# Patient Record
Sex: FEMALE | Race: WHITE | NOT HISPANIC OR LATINO | Employment: OTHER | URBAN - NONMETROPOLITAN AREA
[De-identification: names, ages, dates, MRNs, and addresses within clinical notes are randomized per-mention and may not be internally consistent; named-entity substitution may affect disease eponyms.]

---

## 2017-05-02 ENCOUNTER — OFFICE VISIT (OUTPATIENT)
Dept: OBSTETRICS AND GYNECOLOGY | Facility: CLINIC | Age: 62
End: 2017-05-02

## 2017-05-02 VITALS
BODY MASS INDEX: 20.89 KG/M2 | HEIGHT: 66 IN | SYSTOLIC BLOOD PRESSURE: 110 MMHG | WEIGHT: 130 LBS | DIASTOLIC BLOOD PRESSURE: 72 MMHG

## 2017-05-02 DIAGNOSIS — E28.39 ESTROGEN DEFICIENCY: ICD-10-CM

## 2017-05-02 DIAGNOSIS — Z01.419 WELL WOMAN EXAM WITH ROUTINE GYNECOLOGICAL EXAM: Primary | ICD-10-CM

## 2017-05-02 PROCEDURE — G0123 SCREEN CERV/VAG THIN LAYER: HCPCS | Performed by: NURSE PRACTITIONER

## 2017-05-02 PROCEDURE — 99396 PREV VISIT EST AGE 40-64: CPT | Performed by: NURSE PRACTITIONER

## 2017-05-02 RX ORDER — PANTOPRAZOLE SODIUM 40 MG/1
TABLET, DELAYED RELEASE ORAL
COMMUNITY
Start: 2017-04-04 | End: 2018-06-27

## 2017-05-02 RX ORDER — BUPROPION HYDROCHLORIDE 150 MG/1
TABLET ORAL
COMMUNITY
Start: 2017-04-04 | End: 2019-07-08 | Stop reason: ALTCHOICE

## 2017-05-03 LAB
GEN CATEG CVX/VAG CYTO-IMP: NORMAL
LAB AP CASE REPORT: NORMAL
LAB AP GYN ADDITIONAL INFORMATION: NORMAL
LAB AP GYN OTHER FINDINGS: NORMAL
Lab: NORMAL
PATH INTERP SPEC-IMP: NORMAL
STAT OF ADQ CVX/VAG CYTO-IMP: NORMAL

## 2017-05-16 ENCOUNTER — HOSPITAL ENCOUNTER (OUTPATIENT)
Dept: BONE DENSITY | Facility: HOSPITAL | Age: 62
Discharge: HOME | End: 2017-05-16

## 2017-05-16 ENCOUNTER — HOSPITAL ENCOUNTER (OUTPATIENT)
Dept: MAMMOGRAPHY | Facility: HOSPITAL | Age: 62
Discharge: HOME | End: 2017-05-16
Admitting: NURSE PRACTITIONER

## 2017-05-16 DIAGNOSIS — Z01.419 WELL WOMAN EXAM WITH ROUTINE GYNECOLOGICAL EXAM: ICD-10-CM

## 2017-05-16 DIAGNOSIS — E28.39 ESTROGEN DEFICIENCY: ICD-10-CM

## 2017-05-16 PROCEDURE — G0202 SCR MAMMO BI INCL CAD: HCPCS

## 2017-05-16 PROCEDURE — 77063 BREAST TOMOSYNTHESIS BI: CPT

## 2017-09-27 ENCOUNTER — TRANSCRIBE ORDERS (OUTPATIENT)
Dept: ADMINISTRATIVE | Facility: HOSPITAL | Age: 62
End: 2017-09-27

## 2017-09-27 ENCOUNTER — HOSPITAL ENCOUNTER (OUTPATIENT)
Dept: CT IMAGING | Facility: HOSPITAL | Age: 62
Discharge: HOME | End: 2017-09-27

## 2017-09-27 DIAGNOSIS — Z13.9 SCREENING: Primary | ICD-10-CM

## 2017-09-27 PROCEDURE — G0297 LDCT FOR LUNG CA SCREEN: HCPCS

## 2018-06-27 ENCOUNTER — OFFICE VISIT (OUTPATIENT)
Dept: OBSTETRICS AND GYNECOLOGY | Facility: CLINIC | Age: 63
End: 2018-06-27

## 2018-06-27 VITALS
SYSTOLIC BLOOD PRESSURE: 110 MMHG | HEIGHT: 67 IN | WEIGHT: 126 LBS | DIASTOLIC BLOOD PRESSURE: 80 MMHG | BODY MASS INDEX: 19.78 KG/M2

## 2018-06-27 DIAGNOSIS — Z01.419 WELL WOMAN EXAM WITH ROUTINE GYNECOLOGICAL EXAM: Primary | ICD-10-CM

## 2018-06-27 DIAGNOSIS — Z12.31 ENCOUNTER FOR SCREENING MAMMOGRAM FOR MALIGNANT NEOPLASM OF BREAST: ICD-10-CM

## 2018-06-27 PROCEDURE — 99396 PREV VISIT EST AGE 40-64: CPT | Performed by: NURSE PRACTITIONER

## 2018-06-27 PROCEDURE — G0123 SCREEN CERV/VAG THIN LAYER: HCPCS | Performed by: NURSE PRACTITIONER

## 2018-06-29 LAB
GEN CATEG CVX/VAG CYTO-IMP: NORMAL
LAB AP CASE REPORT: NORMAL
LAB AP GYN ADDITIONAL INFORMATION: NORMAL
LAB AP GYN OTHER FINDINGS: NORMAL
PATH INTERP SPEC-IMP: NORMAL
STAT OF ADQ CVX/VAG CYTO-IMP: NORMAL

## 2018-07-02 NOTE — PROGRESS NOTES
"Mary Jane Pandya is a 63 y.o.      Chief Complaint   Patient presents with   • Gynecologic Exam     Pt. voices no complaints           HPI -Mary Jane is in  For annual gyn exam.  She has no complaints and has no vaginal bleeding.    The following portions of the patient's history were reviewed and updated as appropriate:vital signs, allergies, current medications, past family history, past medical history, past social history, past surgical history and problem list.      Current Outpatient Prescriptions:   •  buPROPion XL (WELLBUTRIN XL) 150 MG 24 hr tablet, , Disp: , Rfl:     Review of Systems   Constitutional: Negative for activity change and chills.   HENT: Negative for congestion and facial swelling.    Eyes: Negative for discharge and itching.   Respiratory: Negative for apnea, cough and shortness of breath.    Cardiovascular: Negative for chest pain and leg swelling.   Gastrointestinal: Negative for abdominal distention and diarrhea.   Endocrine: Negative for heat intolerance and polydipsia.   Genitourinary: Negative for difficulty urinating, flank pain and hematuria.   Musculoskeletal: Negative for arthralgias and back pain.   Allergic/Immunologic: Negative for food allergies.   Neurological: Negative for dizziness, speech difficulty and light-headedness.   Psychiatric/Behavioral: Negative for agitation and confusion.     Breast ROS: negative    Objective      /80   Ht 170.2 cm (67\")   Wt 57.2 kg (126 lb)   BMI 19.73 kg/m²       Physical Exam   Constitutional: She is oriented to person, place, and time. She appears well-developed and well-nourished. No distress.   HENT:   Head: Normocephalic.   Right Ear: External ear normal.   Left Ear: External ear normal.   Nose: Nose normal.   Mouth/Throat: Oropharynx is clear and moist.   Eyes: Conjunctivae are normal. Right eye exhibits no discharge. Left eye exhibits no discharge. No scleral icterus.   Neck: Normal range of motion. Neck supple. " Carotid bruit is not present. No tracheal deviation present. No thyromegaly present.   Cardiovascular: Normal rate, regular rhythm, normal heart sounds and intact distal pulses.    No murmur heard.  Pulmonary/Chest: Effort normal and breath sounds normal. No respiratory distress. She has no wheezes. Right breast exhibits no inverted nipple, no mass, no nipple discharge, no skin change and no tenderness. Left breast exhibits no inverted nipple, no mass, no nipple discharge, no skin change and no tenderness. Breasts are symmetrical. There is no breast swelling.   Abdominal: Soft. She exhibits no distension and no mass. There is no tenderness. There is no guarding. No hernia. Hernia confirmed negative in the right inguinal area and confirmed negative in the left inguinal area.   Genitourinary: Rectum normal, vagina normal and uterus normal. Rectal exam shows no mass. No breast tenderness, discharge or bleeding. Pelvic exam was performed with patient supine. There is no rash, tenderness, lesion or injury on the right labia. There is no rash, tenderness, lesion or injury on the left labia. Uterus is not enlarged, not fixed and not tender. Cervix exhibits no motion tenderness, no discharge and no friability. Right adnexum displays no mass, no tenderness and no fullness. Left adnexum displays no mass, no tenderness and no fullness. No erythema, tenderness or bleeding in the vagina. No foreign body in the vagina. No signs of injury around the vagina. No vaginal discharge found.   Genitourinary Comments:   BSU normal  Urethral meatus  Normal  Perineum  Normal   Musculoskeletal: Normal range of motion. She exhibits no edema or tenderness.   Lymphadenopathy:        Head (right side): No submental, no submandibular, no tonsillar, no preauricular, no posterior auricular and no occipital adenopathy present.        Head (left side): No submental, no submandibular, no tonsillar, no preauricular, no posterior auricular and no  occipital adenopathy present.     She has no cervical adenopathy.        Right cervical: No superficial cervical, no deep cervical and no posterior cervical adenopathy present.       Left cervical: No superficial cervical, no deep cervical and no posterior cervical adenopathy present.     She has no axillary adenopathy.        Right: No inguinal adenopathy present.        Left: No inguinal adenopathy present.   Neurological: She is alert and oriented to person, place, and time. Coordination normal.   Skin: Skin is warm and dry. No bruising and no rash noted. She is not diaphoretic. No erythema.   Psychiatric: She has a normal mood and affect. Her behavior is normal. Judgment and thought content normal.   Nursing note and vitals reviewed.       Assessment/Plan     Mary Jane was seen today for gynecologic exam.    Diagnoses and all orders for this visit:    Well woman exam with routine gynecological exam  -     Liquid-based Pap Smear, Screening    Encounter for screening mammogram for malignant neoplasm of breast  -     Mammo Screening Digital Tomosynthesis Bilateral With CAD; Future    Patient is counseled re: BSE, diet, exercise, mammogram, calcium and Vit. D3              Catia Menchaca, APRN  7/2/2018

## 2018-07-11 ENCOUNTER — HOSPITAL ENCOUNTER (OUTPATIENT)
Dept: MAMMOGRAPHY | Facility: HOSPITAL | Age: 63
Discharge: HOME | End: 2018-07-11
Admitting: NURSE PRACTITIONER

## 2018-07-11 DIAGNOSIS — Z12.31 ENCOUNTER FOR SCREENING MAMMOGRAM FOR MALIGNANT NEOPLASM OF BREAST: ICD-10-CM

## 2018-07-11 PROCEDURE — 77067 SCR MAMMO BI INCL CAD: CPT

## 2018-07-11 PROCEDURE — 77063 BREAST TOMOSYNTHESIS BI: CPT

## 2018-07-19 ENCOUNTER — DOCUMENTATION (OUTPATIENT)
Dept: OBSTETRICS AND GYNECOLOGY | Facility: CLINIC | Age: 63
End: 2018-07-19

## 2018-09-06 ENCOUNTER — TRANSCRIBE ORDERS (OUTPATIENT)
Dept: ADMINISTRATIVE | Facility: HOSPITAL | Age: 63
End: 2018-09-06

## 2018-09-06 ENCOUNTER — HOSPITAL ENCOUNTER (OUTPATIENT)
Dept: CT IMAGING | Facility: HOSPITAL | Age: 63
Discharge: HOME | End: 2018-09-06

## 2018-09-06 DIAGNOSIS — Z12.9 CANCER SCREENING: Primary | ICD-10-CM

## 2018-09-06 PROCEDURE — G0297 LDCT FOR LUNG CA SCREEN: HCPCS

## 2019-07-08 ENCOUNTER — OFFICE VISIT (OUTPATIENT)
Dept: OBSTETRICS AND GYNECOLOGY | Facility: CLINIC | Age: 64
End: 2019-07-08

## 2019-07-08 VITALS
DIASTOLIC BLOOD PRESSURE: 80 MMHG | SYSTOLIC BLOOD PRESSURE: 120 MMHG | WEIGHT: 131 LBS | HEIGHT: 67 IN | BODY MASS INDEX: 20.56 KG/M2

## 2019-07-08 DIAGNOSIS — Z12.31 ENCOUNTER FOR SCREENING MAMMOGRAM FOR MALIGNANT NEOPLASM OF BREAST: ICD-10-CM

## 2019-07-08 DIAGNOSIS — N64.3 GALACTORRHEA NOT ASSOCIATED WITH CHILDBIRTH: ICD-10-CM

## 2019-07-08 DIAGNOSIS — Z01.419 WELL WOMAN EXAM WITH ROUTINE GYNECOLOGICAL EXAM: Primary | ICD-10-CM

## 2019-07-08 DIAGNOSIS — R10.2 PELVIC PAIN: ICD-10-CM

## 2019-07-08 DIAGNOSIS — Z78.0 MENOPAUSE: ICD-10-CM

## 2019-07-08 DIAGNOSIS — F32.89 OTHER DEPRESSION: ICD-10-CM

## 2019-07-08 PROCEDURE — 87624 HPV HI-RISK TYP POOLED RSLT: CPT | Performed by: NURSE PRACTITIONER

## 2019-07-08 PROCEDURE — G0123 SCREEN CERV/VAG THIN LAYER: HCPCS | Performed by: NURSE PRACTITIONER

## 2019-07-08 PROCEDURE — 99396 PREV VISIT EST AGE 40-64: CPT | Performed by: NURSE PRACTITIONER

## 2019-07-08 RX ORDER — BUPROPION HYDROCHLORIDE 300 MG/1
TABLET ORAL
Qty: 30 TABLET | Refills: 12 | Status: SHIPPED | OUTPATIENT
Start: 2019-07-08 | End: 2020-08-05

## 2019-07-08 RX ORDER — MULTIVIT WITH MINERALS/LUTEIN
1000 TABLET ORAL
COMMUNITY

## 2019-07-08 NOTE — PROGRESS NOTES
Mary Jane Pandya is a 64 y.o.      Chief Complaint   Patient presents with   • Gynecologic Exam     Pt voices no complaints.           HPI patient is in today for an annual exam.  She has no vaginal bleeding and no complaints other than intermittent pelvic pain that is very mild.  No history of abnormal Pap smears.  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      The following portions of the patient's history were reviewed and updated as appropriate:vital signs, allergies, current medications, past family history, past medical history, past social history, past surgical history and problem list.      Current Outpatient Medications:   •  buPROPion XL (WELLBUTRIN XL) 150 MG 24 hr tablet, , Disp: , Rfl:   •  CALCIUM PO, Take 500 mg by mouth., Disp: , Rfl:   •  vitamin E 1000 UNIT capsule, Take 1,000 Units by mouth., Disp: , Rfl:     Review of Systems   Constitutional: Negative for activity change, appetite change and fever.   HENT: Negative for congestion, dental problem, facial swelling, nosebleeds and sinus pressure.    Eyes: Negative for blurred vision, discharge and redness.   Respiratory: Negative for apnea, chest tightness and shortness of breath.    Gastrointestinal: Negative for abdominal distention, abdominal pain, constipation and nausea.        Off and on  Right pelvic  pain   Endocrine: Negative for cold intolerance and heat intolerance.   Genitourinary: Negative for breast discharge, difficulty urinating, flank pain, hematuria, urgency and vaginal bleeding.        Right pelvic pain off and on   Musculoskeletal: Negative for arthralgias, back pain, joint swelling and myalgias.   Neurological: Negative for syncope, speech difficulty, headache and confusion.   Psychiatric/Behavioral: Negative for agitation, decreased concentration, hallucinations and self-injury.         Objective  "     /80   Ht 170.2 cm (67\")   Wt 59.4 kg (131 lb)   BMI 20.52 kg/m²       Physical Exam   Constitutional: She is oriented to person, place, and time. She appears well-developed and well-nourished.   HENT:   Head: Normocephalic and atraumatic.   Eyes: Right eye exhibits no discharge. Left eye exhibits no discharge.   Neck: Neck supple. No thyromegaly present.   Cardiovascular: Normal rate and regular rhythm.   Pulmonary/Chest: Effort normal. No stridor. No respiratory distress.   Abdominal: Soft. She exhibits no distension. There is tenderness (off and mild right lower pelvic pain).   Genitourinary: Rectal exam shows guaiac negative stool. No vaginal discharge found.   Musculoskeletal: She exhibits no edema or deformity.   Neurological: She is alert and oriented to person, place, and time.   Skin: Skin is warm and dry.   Psychiatric: She has a normal mood and affect. Her behavior is normal. Thought content normal.   Nursing note and vitals reviewed.       Assessment/Plan     Mary Jane was seen today for gynecologic exam.    Diagnoses and all orders for this visit:    Well woman exam with routine gynecological exam    Encounter for screening mammogram for malignant neoplasm of breast  -     Mammo Screening Digital Tomosynthesis Bilateral With CAD; Future    Menopause    Galactorrhea not associated with childbirth  Mary Jane had a small bit of clear discharge from 1 nipple x 1.      Pelvic pain Ludy mentions that she has had mild pelvic pain of recent onset that she was concerned about.      .Patient is counseled re: BSE, diet, exercise, mammogram, calcium and Vit. D3    RTO for US for look at ovaries.      Catia Menchaca, APRN  7/8/2019           "

## 2019-07-09 LAB — PROLACTIN SERPL-MCNC: 7.9 NG/ML (ref 4.8–23.3)

## 2019-07-10 LAB
GEN CATEG CVX/VAG CYTO-IMP: NORMAL
HPV I/H RISK 4 DNA CVX QL PROBE+SIG AMP: NOT DETECTED
LAB AP CASE REPORT: NORMAL
LAB AP GYN ADDITIONAL INFORMATION: NORMAL
LAB AP GYN OTHER FINDINGS: NORMAL
PATH INTERP SPEC-IMP: NORMAL
STAT OF ADQ CVX/VAG CYTO-IMP: NORMAL

## 2019-07-22 ENCOUNTER — OFFICE VISIT (OUTPATIENT)
Dept: OBSTETRICS AND GYNECOLOGY | Facility: CLINIC | Age: 64
End: 2019-07-22

## 2019-07-22 ENCOUNTER — PROCEDURE VISIT (OUTPATIENT)
Dept: OBSTETRICS AND GYNECOLOGY | Facility: CLINIC | Age: 64
End: 2019-07-22

## 2019-07-22 ENCOUNTER — HOSPITAL ENCOUNTER (OUTPATIENT)
Dept: MAMMOGRAPHY | Facility: HOSPITAL | Age: 64
Discharge: HOME | End: 2019-07-22
Admitting: NURSE PRACTITIONER

## 2019-07-22 ENCOUNTER — APPOINTMENT (OUTPATIENT)
Dept: BONE DENSITY | Facility: HOSPITAL | Age: 64
End: 2019-07-22

## 2019-07-22 VITALS
DIASTOLIC BLOOD PRESSURE: 74 MMHG | HEIGHT: 67 IN | WEIGHT: 131 LBS | BODY MASS INDEX: 20.56 KG/M2 | SYSTOLIC BLOOD PRESSURE: 128 MMHG

## 2019-07-22 DIAGNOSIS — R10.2 PELVIC PAIN: ICD-10-CM

## 2019-07-22 DIAGNOSIS — N83.202 CYST OF LEFT OVARY: Primary | ICD-10-CM

## 2019-07-22 DIAGNOSIS — Z12.31 ENCOUNTER FOR SCREENING MAMMOGRAM FOR MALIGNANT NEOPLASM OF BREAST: ICD-10-CM

## 2019-07-22 DIAGNOSIS — R10.2 PELVIC PAIN: Primary | ICD-10-CM

## 2019-07-22 PROCEDURE — 77067 SCR MAMMO BI INCL CAD: CPT

## 2019-07-22 PROCEDURE — 99213 OFFICE O/P EST LOW 20 MIN: CPT | Performed by: NURSE PRACTITIONER

## 2019-07-22 PROCEDURE — 76830 TRANSVAGINAL US NON-OB: CPT | Performed by: OBSTETRICS & GYNECOLOGY

## 2019-07-22 PROCEDURE — 77063 BREAST TOMOSYNTHESIS BI: CPT

## 2019-07-22 NOTE — PROGRESS NOTES
"      Mary Jane Pandya is a 64 y.o.      Chief Complaint   Patient presents with   • Ovarian Cyst     Pt is here to discuss cyst.  She had an US today.           HPI -Mary Jane had been having mild right lower pelvic discomfort but it has now resolved.  She does not have any vaginal bleeding.      The following portions of the patient's history were reviewed and updated as appropriate:vital signs, allergies, current medications, past family history, past medical history, past social history, past surgical history and problem list.      Current Outpatient Medications:   •  buPROPion XL (WELLBUTRIN XL) 300 MG 24 hr tablet, 1 po q am, Disp: 30 tablet, Rfl: 12  •  CALCIUM PO, Take 500 mg by mouth., Disp: , Rfl:   •  vitamin E 1000 UNIT capsule, Take 1,000 Units by mouth., Disp: , Rfl:     Review of Systems   Constitutional: Negative for activity change, appetite change and fever.   HENT: Negative for congestion, dental problem, facial swelling, nosebleeds and sinus pressure.    Eyes: Negative for blurred vision, discharge and redness.   Respiratory: Negative for apnea, chest tightness and shortness of breath.    Gastrointestinal: Negative for abdominal distention, abdominal pain, constipation and nausea.        Off and on  Right pelvic  pain   Endocrine: Negative for cold intolerance and heat intolerance.   Genitourinary: Negative for breast discharge, difficulty urinating, flank pain, hematuria, urgency and vaginal bleeding.        Right pelvic pain off and on   Musculoskeletal: Negative for arthralgias, back pain, joint swelling and myalgias.   Neurological: Negative for syncope, speech difficulty, headache and confusion.   Psychiatric/Behavioral: Negative for agitation, decreased concentration, hallucinations and self-injury.         Objective      /74   Ht 170.2 cm (67\")   Wt 59.4 kg (131 lb)   BMI 20.52 kg/m²       Physical Exam   Constitutional: She is oriented to person, place, and time. She appears " well-developed and well-nourished. No distress.   HENT:   Head: Normocephalic and atraumatic.   Eyes: Right eye exhibits no discharge. Left eye exhibits no discharge.   Neck: Normal range of motion. No thyromegaly present.   Cardiovascular:   No murmur heard.  Pulmonary/Chest: Effort normal. She has no wheezes.   Abdominal: Soft. She exhibits no distension. There is no tenderness.   Musculoskeletal: Normal range of motion.   Neurological: She is alert and oriented to person, place, and time.   Skin: Skin is warm and dry.   Psychiatric: She has a normal mood and affect. Her behavior is normal. Judgment normal.   Nursing note and vitals reviewed.       Assessment/Plan     ASSESSMENT/PLAN    Mary Jane was seen today for ovarian cyst.    Diagnoses and all orders for this visit:    Cyst of left ovary  Comments:  1 cm  simple  US shows endometrium to be 5.2, fluid filled , patient is postmenopausal and has not vaginal bleeiding  Orders:  -         Pelvic pain  Right, mild and now has resolved.                Catia Menchaca, APRN  7/22/2019

## 2019-07-23 ENCOUNTER — APPOINTMENT (OUTPATIENT)
Dept: BONE DENSITY | Facility: HOSPITAL | Age: 64
End: 2019-07-23

## 2019-07-23 LAB — CANCER AG125 SERPL-ACNC: 13.4 U/ML (ref 0–38.1)

## 2019-07-29 ENCOUNTER — APPOINTMENT (OUTPATIENT)
Dept: BONE DENSITY | Facility: HOSPITAL | Age: 64
End: 2019-07-29

## 2019-08-12 ENCOUNTER — HOSPITAL ENCOUNTER (OUTPATIENT)
Dept: BONE DENSITY | Facility: HOSPITAL | Age: 64
Discharge: HOME | End: 2019-08-12
Admitting: NURSE PRACTITIONER

## 2019-08-12 DIAGNOSIS — Z78.0 MENOPAUSE: ICD-10-CM

## 2019-08-12 PROCEDURE — 77080 DXA BONE DENSITY AXIAL: CPT

## 2019-08-13 DIAGNOSIS — M85.80 OSTEOPENIA, UNSPECIFIED LOCATION: Primary | ICD-10-CM

## 2019-08-13 NOTE — PROGRESS NOTES
Tell Mary Jane that I want to check her vit. D and calcium as she has had a decrease in the bone density of her hip.    I will place the orders and she can come by and have theses done.  If they are normal, I will have her increase weight bearing exercises and adjust her Vit. D intake based on the levela.  Also, tell her that her  was normal.   DONN Garsia

## 2019-09-16 ENCOUNTER — TRANSCRIBE ORDERS (OUTPATIENT)
Dept: ADMINISTRATIVE | Facility: HOSPITAL | Age: 64
End: 2019-09-16

## 2019-09-16 ENCOUNTER — HOSPITAL ENCOUNTER (OUTPATIENT)
Dept: CT IMAGING | Facility: HOSPITAL | Age: 64
Discharge: HOME | End: 2019-09-16

## 2019-09-16 DIAGNOSIS — Z13.9 SCREENING PROCEDURE: ICD-10-CM

## 2019-09-16 DIAGNOSIS — Z13.9 SCREENING PROCEDURE: Primary | ICD-10-CM

## 2019-09-16 PROCEDURE — G0297 LDCT FOR LUNG CA SCREEN: HCPCS

## 2019-10-02 ENCOUNTER — TELEPHONE (OUTPATIENT)
Dept: OBSTETRICS AND GYNECOLOGY | Facility: CLINIC | Age: 64
End: 2019-10-02

## 2019-10-02 NOTE — TELEPHONE ENCOUNTER
LMOM that US  Shows a small simple cyst and Dr. Palacios said would not worry about it unless symptoms but could repeat it if she would like in 6 months to 1 year.  mitral regurgitation   LMOM to return my call.  DONN Garsia

## 2020-07-13 ENCOUNTER — OFFICE VISIT (OUTPATIENT)
Dept: OBSTETRICS AND GYNECOLOGY | Facility: CLINIC | Age: 65
End: 2020-07-13

## 2020-07-13 VITALS
HEIGHT: 67 IN | DIASTOLIC BLOOD PRESSURE: 70 MMHG | SYSTOLIC BLOOD PRESSURE: 110 MMHG | WEIGHT: 131 LBS | BODY MASS INDEX: 20.56 KG/M2

## 2020-07-13 DIAGNOSIS — N83.8 OVARIAN MASS, LEFT: ICD-10-CM

## 2020-07-13 DIAGNOSIS — Z78.0 POST-MENOPAUSE: ICD-10-CM

## 2020-07-13 DIAGNOSIS — Z01.419 WELL WOMAN EXAM WITH ROUTINE GYNECOLOGICAL EXAM: Primary | ICD-10-CM

## 2020-07-13 DIAGNOSIS — Z12.31 ENCOUNTER FOR SCREENING MAMMOGRAM FOR MALIGNANT NEOPLASM OF BREAST: ICD-10-CM

## 2020-07-13 DIAGNOSIS — M85.80 OSTEOPENIA AFTER MENOPAUSE: ICD-10-CM

## 2020-07-13 DIAGNOSIS — N83.202 CYST OF LEFT OVARY: ICD-10-CM

## 2020-07-13 DIAGNOSIS — Z78.0 OSTEOPENIA AFTER MENOPAUSE: ICD-10-CM

## 2020-07-13 PROBLEM — Z00.00 ENCOUNTER FOR GENERAL ADULT MEDICAL EXAMINATION WITHOUT ABNORMAL FINDINGS: Status: ACTIVE | Noted: 2020-07-13

## 2020-07-13 PROBLEM — N32.9 DISORDER OF BLADDER: Status: ACTIVE | Noted: 2020-07-13

## 2020-07-13 PROBLEM — J98.4 DISORDER OF LUNG: Status: ACTIVE | Noted: 2020-07-13

## 2020-07-13 PROBLEM — K58.9 IRRITABLE BOWEL SYNDROME: Status: ACTIVE | Noted: 2020-07-13

## 2020-07-13 PROCEDURE — 87624 HPV HI-RISK TYP POOLED RSLT: CPT | Performed by: NURSE PRACTITIONER

## 2020-07-13 PROCEDURE — G0123 SCREEN CERV/VAG THIN LAYER: HCPCS | Performed by: NURSE PRACTITIONER

## 2020-07-13 PROCEDURE — G0101 CA SCREEN;PELVIC/BREAST EXAM: HCPCS | Performed by: NURSE PRACTITIONER

## 2020-07-13 NOTE — PROGRESS NOTES
Attempted to obtain health maintenance information, patient unable to provide answers for the following items Tdap, Medicare Annual Wellness Exam, Zoster Vaccine and Hep C.

## 2020-07-13 NOTE — PROGRESS NOTES
"      Mary Jane Pandya is a 65 y.o.      Chief Complaint   Patient presents with   • Gynecologic Exam     I am here for a yearly exam.  No problems           HPI - Patient is in for gyn exam.  SHe is postmenopause and has no vaginal bleeding and she does not take HRT.  Last year she had left ovarian 1 cm small simple cyst with negative .      The following portions of the patient's history were reviewed and updated as appropriate:vital signs, allergies, current medications, past family history, past medical history, past social history, past surgical history and problem list.      Current Outpatient Medications:   •  buPROPion XL (WELLBUTRIN XL) 300 MG 24 hr tablet, 1 po q am, Disp: 30 tablet, Rfl: 12  •  CALCIUM PO, Take 500 mg by mouth., Disp: , Rfl:   •  vitamin E 1000 UNIT capsule, Take 1,000 Units by mouth., Disp: , Rfl:     Review of Systems  Breast ROS: neg  neg    Objective      /70 (BP Location: Right arm, Patient Position: Sitting, Cuff Size: Adult)   Ht 170.2 cm (67\")   Wt 59.4 kg (131 lb)   BMI 20.52 kg/m²       Physical Exam     Assessment/Plan     ASSESSMENT/PLAN    Mary Jane was seen today for gynecologic exam.    Diagnoses and all orders for this visit:    Well woman exam with routine gynecological exam  -     Liquid-based Pap Smear, Screening    Cyst of left ovary  -     US Pelvis Complete    Encounter for screening mammogram for malignant neoplasm of breast  -     Mammo Screening Digital Tomosynthesis Bilateral With CAD; Future    Osteopenia after menopause  -     Cancel: DEXA Bone Density Appendicular; Future    Post-menopause  -     Cancel: DEXA Bone Density Appendicular; Future        Patient is counseled re: BSE, diet, exercise, mammogram, calcium and Vit. D3        Catia Menchaca, APRN  2020          Patient is counseled re: BSE, diet, exercise, mammogram, calcium and Vit. D3  "

## 2020-08-03 ENCOUNTER — OFFICE VISIT (OUTPATIENT)
Dept: OBSTETRICS AND GYNECOLOGY | Facility: CLINIC | Age: 65
End: 2020-08-03

## 2020-08-03 ENCOUNTER — HOSPITAL ENCOUNTER (OUTPATIENT)
Dept: MAMMOGRAPHY | Facility: HOSPITAL | Age: 65
Discharge: HOME | End: 2020-08-03
Admitting: NURSE PRACTITIONER

## 2020-08-03 VITALS
DIASTOLIC BLOOD PRESSURE: 70 MMHG | SYSTOLIC BLOOD PRESSURE: 130 MMHG | HEIGHT: 67 IN | WEIGHT: 136 LBS | BODY MASS INDEX: 21.35 KG/M2

## 2020-08-03 DIAGNOSIS — Z87.891 FORMER SMOKER: ICD-10-CM

## 2020-08-03 DIAGNOSIS — Z87.42 HISTORY OF OVARIAN CYST: Primary | ICD-10-CM

## 2020-08-03 DIAGNOSIS — Z12.31 ENCOUNTER FOR SCREENING MAMMOGRAM FOR MALIGNANT NEOPLASM OF BREAST: ICD-10-CM

## 2020-08-03 PROCEDURE — 77067 SCR MAMMO BI INCL CAD: CPT

## 2020-08-03 PROCEDURE — 99213 OFFICE O/P EST LOW 20 MIN: CPT | Performed by: NURSE PRACTITIONER

## 2020-08-03 PROCEDURE — 77063 BREAST TOMOSYNTHESIS BI: CPT

## 2020-08-03 NOTE — PROGRESS NOTES
Attempted to obtain health maintenance information, patient unable to provide answers for the following items Tdap, Pneumococcal Vaccine, Zoster Vaccine and Hep C.

## 2020-08-03 NOTE — PROGRESS NOTES
"Subjective   Mary Jane Pandya is a 65 y.o. female.     Follow up  Ovarian cyst      The following portions of the patient's history were reviewed and updated as appropriate: allergies, current medications, past family history, past medical history, past social history, past surgical history and problem list.    /70 (BP Location: Right arm, Patient Position: Sitting, Cuff Size: Adult)   Ht 170.2 cm (67\")   Wt 61.7 kg (136 lb)   BMI 21.30 kg/m²     Review of Systems   Constitutional: Negative for activity change, appetite change, fatigue and fever.   Respiratory: Negative for apnea and shortness of breath.    Cardiovascular: Negative for chest pain and palpitations.   Gastrointestinal: Negative for abdominal distention, abdominal pain, constipation, diarrhea, nausea and vomiting.   Endocrine: Negative for cold intolerance and heat intolerance.   Genitourinary: Negative for difficulty urinating, frequency, menstrual problem, pelvic pain, vaginal bleeding, vaginal discharge and vaginal pain.   Neurological: Negative for headaches.   Psychiatric/Behavioral: Negative for agitation and sleep disturbance.       Objective   Physical Exam   Constitutional: She is oriented to person, place, and time. She appears well-developed.   Eyes: Right eye exhibits no discharge. Left eye exhibits no discharge.   Cardiovascular: Normal rate and regular rhythm.   Pulmonary/Chest: Effort normal and breath sounds normal.   Neurological: She is alert and oriented to person, place, and time.   Skin: Skin is warm.   Psychiatric: She has a normal mood and affect. Her behavior is normal. Judgment and thought content normal.   Vitals reviewed.      Assessment/Plan   Discussed US with pt.   US indicates uterus 4.69 cm, endometrial thickness 0.54 cm, normal appearing ovaries, small fibroid.     Advised pt given her the endometrial thickness I would like to continue annual US just to be sure that the lining remains stable.   Reviewed S&S to " report, pt voiced understanding.   Patient's Body mass index is 21.3 kg/m². BMI is within normal parameters. No follow-up required..    RV annual exam with US/prn.   Mary Jane was seen today for ovarian cyst.    Diagnoses and all orders for this visit:    History of ovarian cyst    BMI 21.0-21.9, adult    Former smoker

## 2020-08-04 DIAGNOSIS — F32.89 OTHER DEPRESSION: ICD-10-CM

## 2020-08-05 RX ORDER — BUPROPION HYDROCHLORIDE 300 MG/1
TABLET ORAL
Qty: 30 TABLET | Refills: 0 | Status: SHIPPED | OUTPATIENT
Start: 2020-08-05 | End: 2020-09-08

## 2020-09-03 DIAGNOSIS — F32.89 OTHER DEPRESSION: ICD-10-CM

## 2020-09-08 RX ORDER — BUPROPION HYDROCHLORIDE 300 MG/1
TABLET ORAL
Qty: 30 TABLET | Refills: 0 | Status: SHIPPED | OUTPATIENT
Start: 2020-09-08 | End: 2020-10-08

## 2020-10-06 DIAGNOSIS — F32.89 OTHER DEPRESSION: ICD-10-CM

## 2020-10-08 RX ORDER — BUPROPION HYDROCHLORIDE 300 MG/1
TABLET ORAL
Qty: 30 TABLET | Refills: 3 | Status: SHIPPED | OUTPATIENT
Start: 2020-10-08 | End: 2020-10-08 | Stop reason: SDUPTHER

## 2020-10-08 RX ORDER — BUPROPION HYDROCHLORIDE 300 MG/1
TABLET ORAL
Qty: 30 TABLET | Refills: 3 | Status: SHIPPED | OUTPATIENT
Start: 2020-10-08 | End: 2021-02-22 | Stop reason: SDUPTHER

## 2020-10-08 NOTE — TELEPHONE ENCOUNTER
Requests Wellbutrin RF with last yearly 7/13/2020 please see pended med and accept if appropriate.

## 2021-02-22 DIAGNOSIS — F32.89 OTHER DEPRESSION: ICD-10-CM

## 2021-02-22 RX ORDER — BUPROPION HYDROCHLORIDE 300 MG/1
TABLET ORAL
Qty: 30 TABLET | Refills: 3 | Status: SHIPPED | OUTPATIENT
Start: 2021-02-22

## 2021-02-22 RX ORDER — BUPROPION HYDROCHLORIDE 300 MG/1
TABLET ORAL
Qty: 30 TABLET | Refills: 3 | Status: CANCELLED | OUTPATIENT
Start: 2021-02-22

## 2021-04-26 ENCOUNTER — HOSPITAL ENCOUNTER (OUTPATIENT)
Dept: CT IMAGING | Facility: HOSPITAL | Age: 66
Discharge: HOME | End: 2021-04-26

## 2021-04-26 ENCOUNTER — TRANSCRIBE ORDERS (OUTPATIENT)
Dept: ADMINISTRATIVE | Facility: HOSPITAL | Age: 66
End: 2021-04-26

## 2021-04-26 DIAGNOSIS — Z13.9 SCREENING FOR UNSPECIFIED CONDITION: ICD-10-CM

## 2021-04-26 DIAGNOSIS — Z13.9 SCREENING FOR UNSPECIFIED CONDITION: Primary | ICD-10-CM

## 2021-04-26 PROCEDURE — 71271 CT THORAX LUNG CANCER SCR C-: CPT

## 2021-05-05 ENCOUNTER — OFFICE VISIT (OUTPATIENT)
Dept: OBSTETRICS AND GYNECOLOGY | Facility: CLINIC | Age: 66
End: 2021-05-05

## 2021-05-05 VITALS
BODY MASS INDEX: 20.56 KG/M2 | WEIGHT: 131 LBS | SYSTOLIC BLOOD PRESSURE: 118 MMHG | HEIGHT: 67 IN | DIASTOLIC BLOOD PRESSURE: 72 MMHG

## 2021-05-05 DIAGNOSIS — N81.10 FEMALE CYSTOCELE: ICD-10-CM

## 2021-05-05 DIAGNOSIS — N81.6 RECTOCELE: Primary | ICD-10-CM

## 2021-05-05 DIAGNOSIS — Z87.891 FORMER SMOKER: ICD-10-CM

## 2021-05-05 DIAGNOSIS — N94.9 VAGINAL DISCOMFORT: ICD-10-CM

## 2021-05-05 PROCEDURE — 99213 OFFICE O/P EST LOW 20 MIN: CPT | Performed by: OBSTETRICS & GYNECOLOGY

## 2021-05-05 PROCEDURE — 57160 INSERT PESSARY/OTHER DEVICE: CPT | Performed by: OBSTETRICS & GYNECOLOGY

## 2021-05-05 PROCEDURE — A4562 PESSARY, NON RUBBER,ANY TYPE: HCPCS | Performed by: OBSTETRICS & GYNECOLOGY

## 2021-05-05 RX ORDER — CETIRIZINE HYDROCHLORIDE 10 MG/1
10 TABLET ORAL DAILY
COMMUNITY

## 2021-05-05 NOTE — PROGRESS NOTES
"Subjective     Chief Complaint   Patient presents with   • Prolapse     pt c/o prolapse, she is unsure if it is uterus or bladder. c/o some pressure with it.        Mary Jane Pandya is a 66 y.o. year old who presents to be seen for pelvic prolapse.  Patient reports that she recently had a CT done in [place] for sharp LLQ pain, but says that it was normal.  Patient frustrated because \"I am normally a very active person\".  \"This starts off not too bad in the morning, but then as soon as I lift something, there is something like a golf ball coming out of me\".    The patient is not s/p hysterectomy.  She reports positive vaginal pressure and bulge outside her body, but denies urinary incontinence, urinary hesitancy and stool trapping.  The patient feels like the problem began 6 weeks ago.  She is currently sexually active, and is interested in being sexually active in the future.  Mary Jane Pandya has not had surgery for this problem in the past.    Past Medical History:   Diagnosis Date   • Anxiety    • RENE-1 4G/4G genotype        Current Outpatient Medications:   •  BIOTIN PO, Take  by mouth., Disp: , Rfl:   •  buPROPion XL (WELLBUTRIN XL) 300 MG 24 hr tablet, Take daily, Disp: 30 tablet, Rfl: 3  •  CALCIUM PO, Take 500 mg by mouth., Disp: , Rfl:   •  cetirizine (zyrTEC) 10 MG tablet, Take 10 mg by mouth Daily., Disp: , Rfl:   •  Probiotic Product (PROBIOTIC-10 PO), Take  by mouth., Disp: , Rfl:   •  VITAMIN D PO, Take  by mouth., Disp: , Rfl:   •  vitamin E 1000 UNIT capsule, Take 1,000 Units by mouth., Disp: , Rfl:   Family History   Problem Relation Age of Onset   • No Known Problems Mother    • Heart attack Father    • No Known Problems Brother    • Breast cancer Neg Hx    • Ovarian cancer Neg Hx    • Uterine cancer Neg Hx    • Colon cancer Neg Hx      Social History     Socioeconomic History   • Marital status:      Spouse name: Not on file   • Number of children: Not on file   • Years of education: Not on " "file   • Highest education level: Not on file   Tobacco Use   • Smoking status: Former Smoker     Quit date: 1970     Years since quittin.8   • Smokeless tobacco: Never Used   • Tobacco comment: pt quit 36 years ago, smoked for 3 years    Substance and Sexual Activity   • Alcohol use: Yes     Comment: social   • Drug use: No   • Sexual activity: Yes     Partners: Male     Birth control/protection: Post-menopausal     No Known Allergies    Family History   Problem Relation Age of Onset   • No Known Problems Mother    • Heart attack Father    • No Known Problems Brother    • Breast cancer Neg Hx    • Ovarian cancer Neg Hx    • Uterine cancer Neg Hx    • Colon cancer Neg Hx      Review of Systems   Constitutional: Negative for activity change and unexpected weight change.   Respiratory: Negative for shortness of breath.    Cardiovascular: Negative for chest pain.   Gastrointestinal: Negative for abdominal pain, constipation and diarrhea.   Genitourinary: Positive for pelvic pain and vaginal pain (pressure). Negative for difficulty urinating, enuresis (mild MARINA on only rare occasions), frequency, urgency, vaginal bleeding and vaginal discharge.           Objective   /72 (BP Location: Left arm, Patient Position: Sitting)   Ht 170.2 cm (67\")   Wt 59.4 kg (131 lb)   BMI 20.52 kg/m²     Physical Exam  Vitals and nursing note reviewed.   Constitutional:       General: She is not in acute distress.     Appearance: She is well-developed.   HENT:      Head: Normocephalic and atraumatic.   Neck:      Thyroid: No thyromegaly.   Pulmonary:      Effort: Pulmonary effort is normal.   Abdominal:      General: There is no distension.      Palpations: Abdomen is soft.      Tenderness: There is no abdominal tenderness.   Genitourinary:     General: Normal vulva.      Comments: Good support of uterus/cervix.  Grade III cystocele, grade I rectocele.  Vaginal mucosa pale, with loss of rugae.  Musculoskeletal:         " General: Normal range of motion.      Cervical back: Normal range of motion.   Skin:     General: Skin is warm and dry.   Neurological:      Mental Status: She is alert and oriented to person, place, and time.   Psychiatric:         Behavior: Behavior normal.         Judgment: Judgment normal.         Imaging   No data reviewed       Assessment & Plan    Diagnoses and all orders for this visit:    1. Rectocele (Primary)  Comments:  grade I    2. Female cystocele: Patient denies bladder or bowel symptoms, but is bothered by the vaginal pressure and the pulling sensation in her lower pelvis.  She is noted to position to schedule surgery at this time, since she is caring for her grandchildren Monday through Friday.  Patient would not need a hysterectomy since she still has great apical support of the uterus and cervix.  Conservative measures have been discussed to include pelvic floor physical therapy, Nazanin Kendra touch lasering, and prn pessary use.  Patient planning to do physical therapy in [place], since that is where she goes every week to care for her grandchildren.  Patient also want to be fitted with a pessary today, and found a #1 Gellhorn pessary to be comfortable.  I wanted the patient to come back to the office in 1 to 2 weeks for follow-up of the pessary, but she did not feel this was necessary; she felt comfortable taking it in and out to use as needed.  Patient is planning to return to the office when her annual exam is due in July, but was encouraged to call or return sooner if she was having any problems with the pessary.  Comments:  grade III    3. Vaginal discomfort: pressure    4. Former smoker        Anel Kam MD  5/5/2021  10:16 CDT

## 2021-07-23 ENCOUNTER — OFFICE VISIT (OUTPATIENT)
Dept: OBSTETRICS AND GYNECOLOGY | Facility: CLINIC | Age: 66
End: 2021-07-23

## 2021-07-23 VITALS
BODY MASS INDEX: 20.4 KG/M2 | WEIGHT: 130 LBS | SYSTOLIC BLOOD PRESSURE: 112 MMHG | DIASTOLIC BLOOD PRESSURE: 74 MMHG | HEIGHT: 67 IN

## 2021-07-23 DIAGNOSIS — N81.6 RECTOCELE: ICD-10-CM

## 2021-07-23 DIAGNOSIS — Z87.891 FORMER SMOKER: ICD-10-CM

## 2021-07-23 DIAGNOSIS — N81.10 FEMALE CYSTOCELE: ICD-10-CM

## 2021-07-23 DIAGNOSIS — Z12.31 ENCOUNTER FOR SCREENING MAMMOGRAM FOR MALIGNANT NEOPLASM OF BREAST: ICD-10-CM

## 2021-07-23 DIAGNOSIS — Z01.419 WELL WOMAN EXAM WITH ROUTINE GYNECOLOGICAL EXAM: ICD-10-CM

## 2021-07-23 DIAGNOSIS — Z78.0 MENOPAUSE: Primary | ICD-10-CM

## 2021-07-23 PROCEDURE — G0101 CA SCREEN;PELVIC/BREAST EXAM: HCPCS | Performed by: OBSTETRICS & GYNECOLOGY

## 2021-07-23 RX ORDER — MULTIPLE VITAMINS W/ MINERALS TAB 9MG-400MCG
1 TAB ORAL DAILY
COMMUNITY

## 2021-07-23 NOTE — PROGRESS NOTES
Subjective   Chief Complaint   Patient presents with   • Annual Exam     pt here today for annual exam. pt says that she is no longer using a pessary.  pt voices no other concerns.      Mary Jane Pandya is a 66 y.o. year old  menopausal female presenting to be seen for her annual exam.  Overall, the patient reports to be feeling well.  Patient was fitted for a pessary in May, but did not do well with it.  She had a lot of pressure and feels like it was sticking out ([place]).  Additionally, she had tremendous discharge.  Patient has been doing physical therapy exercises and feels like the problem has improved. She also admits that they were moving and doing a lot of heavy lifting - not that the lifting is done, she feels like that has helped too.  The patient denies any vaginal bleeding in the past 12 months  Hot flashes and night sweats are not a significant problem.    She is sexually active.  In the past year there has not been new sexual partners.  She is not having any problems.    Patient reports regular self breast exams: no  She exercises regularly: yes.  She has noticed changes in height: no.    No Additional Complaints Reported    The following portions of the patient's history were reviewed and updated as appropriate:problem list, current medications, allergies, past family history, past medical history, past social history and past surgical history.  Social History    Tobacco Use      Smoking status: Former Smoker        Quit date: 1970        Years since quittin.0      Smokeless tobacco: Never Used      Tobacco comment: pt quit 36 years ago, smoked for 3 years     Review of Systems   Constitutional: Negative for activity change and unexpected weight change.   HENT: Negative for congestion.    Eyes: Negative for visual disturbance.   Respiratory: Negative for shortness of breath.    Cardiovascular: Negative for chest pain.   Gastrointestinal: Negative for abdominal pain, blood in stool,  "constipation (occasionally, manages with diet) and diarrhea.        Colonoscopy up-to-date (does them in [place])   Endocrine: Negative for cold intolerance and heat intolerance.   Genitourinary: Negative for difficulty urinating, dyspareunia, enuresis, frequency, pelvic pain, urgency, vaginal discharge and vaginal pain.   Musculoskeletal: Positive for arthralgias. Negative for back pain, neck pain and neck stiffness.   Skin: Negative for rash.   Neurological: Negative for dizziness and headaches.   Psychiatric/Behavioral: Negative for dysphoric mood and sleep disturbance. The patient is nervous/anxious (well-controlled with Wellbutrin).          Objective   /74   Ht 170.2 cm (67\")   Wt 59 kg (130 lb)   BMI 20.36 kg/m²   Physical Exam  Vitals and nursing note reviewed. Exam conducted with a chaperone present.   Constitutional:       General: She is not in acute distress.     Appearance: She is well-developed.   HENT:      Head: Normocephalic and atraumatic.   Neck:      Thyroid: No thyromegaly.   Cardiovascular:      Rate and Rhythm: Normal rate and regular rhythm.      Heart sounds: No murmur heard.     Pulmonary:      Effort: Pulmonary effort is normal.      Breath sounds: Normal breath sounds.   Chest:      Breasts:         Right: No inverted nipple or mass.         Left: No inverted nipple or mass.   Abdominal:      General: There is no distension.      Palpations: Abdomen is soft.      Tenderness: There is no abdominal tenderness.   Genitourinary:     General: Normal vulva.      Exam position: Lithotomy position.      Labia:         Right: No tenderness or lesion.         Left: No tenderness or lesion.       Urethra: No prolapse.      Vagina: Normal. No vaginal discharge or bleeding.      Cervix: No cervical motion tenderness or discharge.      Adnexa:         Right: No tenderness or fullness.          Left: No tenderness or fullness.        Rectum: No external hemorrhoid or internal hemorrhoid. " Normal anal tone.      Comments: Labia normal, no lesions noted.  Urethral meatus unremarkable, no prolapse of mucosa.  Anus/perineum normal.  Grade II cystocele, grade I rectocele  Musculoskeletal:         General: Normal range of motion.      Cervical back: Normal range of motion and neck supple.   Skin:     General: Skin is warm and dry.   Neurological:      Mental Status: She is alert and oriented to person, place, and time.   Psychiatric:         Behavior: Behavior normal.         Judgment: Judgment normal.            Assessment & Plan    Diagnoses and all orders for this visit:    1. Menopause (Primary)  -     Mammo Screening Bilateral With CAD; Future  -     DEXA Bone Density Axial; Future    2. Encounter for screening mammogram for malignant neoplasm of breast   -     Mammo Screening Bilateral With CAD; Future    3. Female cystocele  Comments:  grade II    4. Former smoker    5. BMI 20.0-20.9, adult: Normal. Patient exercises regularly    6. Rectocele  Comments:  grade I    7.  Well woman exam: Exam remarkable for mild pelvic organ prolapse. Patient previously tried a pessary, but was feeling a lot of pressure from it, so she remove the pessary and has decided to continue with expectant management till symptoms are worse. Regular self breast exam was encouraged, and technique reviewed during exam. Mammogram and bone density both ordered. Routine screening labs managed by PCP. Patient does colonoscopies in [place] because her daughter lives there and is a nurse practitioner. Patient reports colonoscopy is up-to-date.        This note was electronically signed.    Anel Kam MD  7/24/2021  11:25 CDT

## 2021-09-10 ENCOUNTER — HOSPITAL ENCOUNTER (OUTPATIENT)
Dept: BONE DENSITY | Facility: HOSPITAL | Age: 66
Discharge: HOME | End: 2021-09-10

## 2021-09-10 ENCOUNTER — HOSPITAL ENCOUNTER (OUTPATIENT)
Dept: MAMMOGRAPHY | Facility: HOSPITAL | Age: 66
Discharge: HOME | End: 2021-09-10

## 2021-09-10 DIAGNOSIS — Z78.0 MENOPAUSE: ICD-10-CM

## 2021-09-10 DIAGNOSIS — Z12.31 ENCOUNTER FOR SCREENING MAMMOGRAM FOR MALIGNANT NEOPLASM OF BREAST: ICD-10-CM

## 2021-09-10 PROCEDURE — 77063 BREAST TOMOSYNTHESIS BI: CPT

## 2021-09-10 PROCEDURE — 77067 SCR MAMMO BI INCL CAD: CPT

## 2021-09-10 PROCEDURE — 77080 DXA BONE DENSITY AXIAL: CPT

## 2021-09-11 NOTE — PROGRESS NOTES
Please advise patient that her T score has decreased further, although the changes slight.  If she is not already doing so, I recommend faithful calcium with vitamin D, as well as weightbearing exercise several times a week.  If the patient is ready following these recommendations, then she may want to begin medication to slow any further bone loss and we should discuss that at an office visit.

## 2021-10-08 ENCOUNTER — OFFICE VISIT (OUTPATIENT)
Dept: OBSTETRICS AND GYNECOLOGY | Facility: CLINIC | Age: 66
End: 2021-10-08

## 2021-10-08 VITALS
SYSTOLIC BLOOD PRESSURE: 122 MMHG | BODY MASS INDEX: 20.72 KG/M2 | WEIGHT: 132 LBS | DIASTOLIC BLOOD PRESSURE: 72 MMHG | HEIGHT: 67 IN

## 2021-10-08 DIAGNOSIS — N81.10 VAGINAL PROLAPSE: ICD-10-CM

## 2021-10-08 DIAGNOSIS — N94.10 FEMALE DYSPAREUNIA: ICD-10-CM

## 2021-10-08 DIAGNOSIS — N39.3 SUI (STRESS URINARY INCONTINENCE, FEMALE): ICD-10-CM

## 2021-10-08 PROCEDURE — MONALISA: Performed by: OBSTETRICS & GYNECOLOGY

## 2021-10-08 NOTE — PROGRESS NOTES
"Mary Jane Pandya is a 66 y.o. year old  presenting for Nazanin Kendra Touch treatment 1.  The patient's indication for the procedure is vaginal prolapse, mild MARINA and painful intercourse.    N[facility]in [facility] Touch consent signed by the patient:  Yes    Patient placed in lithotomy position:  Yes    Topical lidocaine cream applied externally:  Yes    Protective eyewear given to patient:  Yes    Vagina swabbed to remove any discharge or creams:  Yes    Topical anesthetic cream removed:  Yes    /72   Ht 170.2 cm (67\")   Wt 59.9 kg (132 lb)   BMI 20.67 kg/m²     Internal Treatment  Power 30 W  Dwell time 1000 micro sec Spacing 1000 micro m  Shape Square  Smart Stack  1   Density 6.4%  Size 100%  Scan Mode Normal   Fluency 2.20 J/cm 2  Ratio 10/10  Exposure Single   Pulse Energy 43.2 mJ  Aiming 30%  Emission  DP      External Treatment  Power 26 W  Dwell time 800 micro sec  Spacing 800 micro m  Shape Square  Smart Stack  1   Density 8.7 %  Size 100%  Scan Mode Normal   Fluency 1.93 J/cm 2  Ratio 10/10  Exposure Single   Pulse Energy 27.7 mJ  Aiming 30%  Emission  Smart Pulse      Cold pack applied externally for 3-5 minutes immediately following treatment:  Yes    Patient tolerated procedure: well    Silicone applied to external skin after cooling:  Yes    Patient given Post-procedure instructions:  Yes    Patient will schedule to return for Nazanin Kendra Touch treatment 2 in 6 weeks.    Anel Kam MD  2021    "

## 2021-11-19 ENCOUNTER — OFFICE VISIT (OUTPATIENT)
Dept: OBSTETRICS AND GYNECOLOGY | Facility: CLINIC | Age: 66
End: 2021-11-19

## 2021-11-19 VITALS
HEIGHT: 67 IN | DIASTOLIC BLOOD PRESSURE: 70 MMHG | SYSTOLIC BLOOD PRESSURE: 112 MMHG | WEIGHT: 133 LBS | BODY MASS INDEX: 20.88 KG/M2

## 2021-11-19 DIAGNOSIS — N94.10 FEMALE DYSPAREUNIA: Primary | ICD-10-CM

## 2021-11-19 DIAGNOSIS — N39.3 SUI (STRESS URINARY INCONTINENCE, FEMALE): ICD-10-CM

## 2021-11-19 DIAGNOSIS — N81.10 VAGINAL PROLAPSE: ICD-10-CM

## 2021-11-19 PROCEDURE — MONALISA: Performed by: OBSTETRICS & GYNECOLOGY

## 2021-11-19 NOTE — PROGRESS NOTES
"Mary Jane Pandya is a 66 y.o. year old  presenting for Nazanin Kendra Touch treatment 2.  The patient's indication for the procedure is dyspareunia, vaginal atrophy and MARINA and mild vaginal prolapse.  Since her first treatment 6 weeks ago, the patient has noticed moderate improvement in MARINA, but no difference in prolapse pressure.    Nazanin Kendra Touch consent signed by the patient:  Yes    Patient placed in lithotomy position:  Yes    Topical lidocaine cream applied externally:  Yes    Protective eyewear given to patient:  Yes    Vagina swabbed to remove any discharge or creams:  Yes    Topical anesthetic cream removed:  Yes    /70   Ht 170.2 cm (67\")   Wt 60.3 kg (133 lb)   BMI 20.83 kg/m²     Internal Treatment  Power 30 W  Dwell time 1000 micro sec Spacing 1000 micro m  Shape Square  Smart Stack  3   Density 6.4%  Size 100%  Scan Mode Normal   Fluency 6.61 J/cm 2  Ratio 10/10  Exposure Single   Pulse Energy 129.6 mJ  Aiming 30%  Emission  DP      External Treatment  Power 26 W  Dwell time 800 micro sec  Spacing 800 micro m  Shape Square  Smart Stack  1   Density 8.7%  Size 100%  Scan Mode Normal   Fluency 1.93 J/cm 2  Ratio 10/10  Exposure Single   Pulse Energy 27.7 mJ  Aiming 30%  Emission  Smart Pulse      Cold pack applied externally for 3-5 minutes immediately following treatment:  Yes    Patient tolerated procedure: well    Silicone applied to external skin after cooling:  Yes    Patient given Post-procedure instructions:  Yes    Patient will schedule to return for Nazanin Kendra Touch treatment 3 in 6 weeks.    Anel Kam MD  2021    "

## 2022-01-21 ENCOUNTER — OFFICE VISIT (OUTPATIENT)
Dept: OBSTETRICS AND GYNECOLOGY | Facility: CLINIC | Age: 67
End: 2022-01-21

## 2022-01-21 VITALS
SYSTOLIC BLOOD PRESSURE: 108 MMHG | HEIGHT: 67 IN | BODY MASS INDEX: 21.35 KG/M2 | DIASTOLIC BLOOD PRESSURE: 64 MMHG | WEIGHT: 136 LBS

## 2022-01-21 DIAGNOSIS — N81.10 VAGINAL PROLAPSE: Primary | ICD-10-CM

## 2022-01-21 DIAGNOSIS — N39.3 SUI (STRESS URINARY INCONTINENCE, FEMALE): ICD-10-CM

## 2022-01-21 DIAGNOSIS — N81.10 FEMALE CYSTOCELE: ICD-10-CM

## 2022-01-21 DIAGNOSIS — N94.10 FEMALE DYSPAREUNIA: ICD-10-CM

## 2022-01-21 PROCEDURE — MONALISA: Performed by: OBSTETRICS & GYNECOLOGY

## 2022-01-21 NOTE — PROGRESS NOTES
"Mary Jane Pandya is a 66 y.o. year old  presenting for Nazanin Kendra Touch treatment 3.  The patient's indication for the procedure is dyspareunia, vaginal atrophy and MARINA and mild vaginal prolapse.  Since her first treatment 14 weeks ago, the patient has noticed moderate improvement in MARINA, and mild improvement in prolapse pressure.    Nazanin Kendra Touch consent signed by the patient:  Yes    Patient placed in lithotomy position:  Yes    Topical lidocaine cream applied externally:  Yes    Protective eyewear given to patient:  Yes    Vagina swabbed to remove any discharge or creams:  Yes    Topical anesthetic cream removed:  Yes    /64 (BP Location: Right arm, Patient Position: Sitting, Cuff Size: Adult)   Ht 170.2 cm (67\")   Wt 61.7 kg (136 lb)   Breastfeeding No   BMI 21.30 kg/m²     Internal Treatment  Power 30 W  Dwell time 1000 micro sec Spacing 1000 micro m  Shape Square  Smart Stack  3   Density 6.4%  Size 100%  Scan Mode Normal   Fluency 6.61 J/cm 2  Ratio 10/10  Exposure Single   Pulse Energy 129.6 mJ  Aiming 30%  Emission  DP      External Treatment  Power 26 W  Dwell time 800 micro sec  Spacing 800 micro m  Shape Square  Smart Stack  1   Density 8.7%  Size 100%  Scan Mode Normal   Fluency 1.93 J/cm 2  Ratio 10/10  Exposure Single   Pulse Energy 27.7 mJ  Aiming 30%  Emission  Smart Pulse      Cold pack applied externally for 3-5 minutes immediately following treatment:  Yes    Patient tolerated procedure: well    Silicone applied to external skin after cooling:  Yes    Patient given Post-procedure instructions:  Yes    Patient will schedule to return for follow-up in 6 weeks.    Anel Kam MD  2022  "

## 2022-04-08 ENCOUNTER — HOSPITAL ENCOUNTER (OUTPATIENT)
Dept: CT IMAGING | Facility: HOSPITAL | Age: 67
Discharge: HOME | End: 2022-04-08

## 2022-04-08 ENCOUNTER — TRANSCRIBE ORDERS (OUTPATIENT)
Dept: ADMINISTRATIVE | Facility: HOSPITAL | Age: 67
End: 2022-04-08

## 2022-04-08 DIAGNOSIS — Z13.9 SCREENING FOR UNSPECIFIED CONDITION: ICD-10-CM

## 2022-04-08 DIAGNOSIS — Z13.9 SCREENING FOR UNSPECIFIED CONDITION: Primary | ICD-10-CM

## 2022-04-08 PROCEDURE — 71271 CT THORAX LUNG CANCER SCR C-: CPT

## 2022-05-05 ENCOUNTER — TRANSCRIBE ORDERS (OUTPATIENT)
Dept: ADMINISTRATIVE | Facility: HOSPITAL | Age: 67
End: 2022-05-05

## 2022-05-05 ENCOUNTER — HOSPITAL ENCOUNTER (OUTPATIENT)
Dept: GENERAL RADIOLOGY | Facility: HOSPITAL | Age: 67
Discharge: HOME | End: 2022-05-05

## 2022-05-05 DIAGNOSIS — Z12.9 CANCER SCREENING: Primary | ICD-10-CM

## 2022-05-05 DIAGNOSIS — Z12.9 CANCER SCREENING: ICD-10-CM

## 2022-05-05 PROCEDURE — 71046 X-RAY EXAM CHEST 2 VIEWS: CPT

## 2022-08-15 ENCOUNTER — TRANSCRIBE ORDERS (OUTPATIENT)
Dept: ADMINISTRATIVE | Facility: HOSPITAL | Age: 67
End: 2022-08-15

## 2022-08-15 DIAGNOSIS — Z12.31 ENCOUNTER FOR SCREENING MAMMOGRAM FOR MALIGNANT NEOPLASM OF BREAST: Primary | ICD-10-CM

## 2022-09-23 ENCOUNTER — HOSPITAL ENCOUNTER (OUTPATIENT)
Dept: MAMMOGRAPHY | Facility: HOSPITAL | Age: 67
Discharge: HOME | End: 2022-09-23
Admitting: OBSTETRICS & GYNECOLOGY

## 2022-09-23 DIAGNOSIS — Z12.31 ENCOUNTER FOR SCREENING MAMMOGRAM FOR MALIGNANT NEOPLASM OF BREAST: ICD-10-CM

## 2022-09-23 PROCEDURE — 77063 BREAST TOMOSYNTHESIS BI: CPT

## 2022-09-23 PROCEDURE — 77067 SCR MAMMO BI INCL CAD: CPT

## 2023-04-14 ENCOUNTER — HOSPITAL ENCOUNTER (OUTPATIENT)
Dept: CT IMAGING | Facility: HOSPITAL | Age: 68
Discharge: HOME | End: 2023-04-14

## 2023-04-14 ENCOUNTER — TRANSCRIBE ORDERS (OUTPATIENT)
Dept: ADMINISTRATIVE | Facility: HOSPITAL | Age: 68
End: 2023-04-14
Payer: MEDICARE

## 2023-04-14 DIAGNOSIS — Z13.9 SCREENING FOR UNSPECIFIED CONDITION: ICD-10-CM

## 2023-04-14 DIAGNOSIS — Z13.9 SCREENING FOR UNSPECIFIED CONDITION: Primary | ICD-10-CM

## 2023-04-14 PROCEDURE — 71271 CT THORAX LUNG CANCER SCR C-: CPT

## 2024-04-19 ENCOUNTER — TRANSCRIBE ORDERS (OUTPATIENT)
Dept: ADMINISTRATIVE | Facility: HOSPITAL | Age: 69
End: 2024-04-19
Payer: MEDICARE

## 2024-04-19 ENCOUNTER — HOSPITAL ENCOUNTER (OUTPATIENT)
Dept: CT IMAGING | Facility: HOSPITAL | Age: 69
Discharge: HOME | End: 2024-04-19

## 2024-04-19 DIAGNOSIS — Z13.9 SCREENING FOR UNSPECIFIED CONDITION: ICD-10-CM

## 2024-04-19 DIAGNOSIS — Z13.9 SCREENING FOR UNSPECIFIED CONDITION: Primary | ICD-10-CM

## 2024-04-19 PROCEDURE — 71271 CT THORAX LUNG CANCER SCR C-: CPT

## 2024-06-26 ENCOUNTER — TRANSCRIBE ORDERS (OUTPATIENT)
Dept: ADMINISTRATIVE | Facility: HOSPITAL | Age: 69
End: 2024-06-26
Payer: MEDICARE

## 2024-06-26 DIAGNOSIS — Z12.31 ENCOUNTER FOR SCREENING MAMMOGRAM FOR MALIGNANT NEOPLASM OF BREAST: Primary | ICD-10-CM

## 2024-07-01 ENCOUNTER — HOSPITAL ENCOUNTER (OUTPATIENT)
Dept: MAMMOGRAPHY | Facility: HOSPITAL | Age: 69
Discharge: HOME | End: 2024-07-01
Admitting: OBSTETRICS & GYNECOLOGY
Payer: MEDICARE

## 2024-07-01 DIAGNOSIS — Z12.31 ENCOUNTER FOR SCREENING MAMMOGRAM FOR MALIGNANT NEOPLASM OF BREAST: ICD-10-CM

## 2024-07-01 PROCEDURE — 77063 BREAST TOMOSYNTHESIS BI: CPT

## 2024-07-01 PROCEDURE — 77067 SCR MAMMO BI INCL CAD: CPT

## 2025-04-29 ENCOUNTER — HOSPITAL ENCOUNTER (OUTPATIENT)
Dept: CT IMAGING | Facility: HOSPITAL | Age: 70
Discharge: HOME | End: 2025-04-29

## 2025-04-29 ENCOUNTER — TRANSCRIBE ORDERS (OUTPATIENT)
Dept: ADMINISTRATIVE | Facility: HOSPITAL | Age: 70
End: 2025-04-29
Payer: MEDICARE

## 2025-04-29 DIAGNOSIS — Z13.9 SCREENING FOR UNSPECIFIED CONDITION: ICD-10-CM

## 2025-04-29 DIAGNOSIS — Z13.9 SCREENING FOR UNSPECIFIED CONDITION: Primary | ICD-10-CM

## 2025-04-29 PROCEDURE — 71271 CT THORAX LUNG CANCER SCR C-: CPT
